# Patient Record
Sex: FEMALE | Race: WHITE | NOT HISPANIC OR LATINO
[De-identification: names, ages, dates, MRNs, and addresses within clinical notes are randomized per-mention and may not be internally consistent; named-entity substitution may affect disease eponyms.]

---

## 2017-11-06 ENCOUNTER — RESULT REVIEW (OUTPATIENT)
Age: 37
End: 2017-11-06

## 2018-11-13 ENCOUNTER — RESULT REVIEW (OUTPATIENT)
Age: 38
End: 2018-11-13

## 2019-12-05 ENCOUNTER — RESULT REVIEW (OUTPATIENT)
Age: 39
End: 2019-12-05

## 2020-03-16 ENCOUNTER — APPOINTMENT (OUTPATIENT)
Dept: OTOLARYNGOLOGY | Facility: CLINIC | Age: 40
End: 2020-03-16

## 2020-12-09 ENCOUNTER — RESULT REVIEW (OUTPATIENT)
Age: 40
End: 2020-12-09

## 2022-06-10 ENCOUNTER — APPOINTMENT (OUTPATIENT)
Dept: PULMONOLOGY | Facility: CLINIC | Age: 42
End: 2022-06-10
Payer: COMMERCIAL

## 2022-06-10 VITALS
WEIGHT: 162 LBS | TEMPERATURE: 98.3 F | DIASTOLIC BLOOD PRESSURE: 72 MMHG | HEART RATE: 77 BPM | OXYGEN SATURATION: 98 % | SYSTOLIC BLOOD PRESSURE: 126 MMHG | BODY MASS INDEX: 26.67 KG/M2 | HEIGHT: 65.5 IN

## 2022-06-10 DIAGNOSIS — Z83.2 FAMILY HISTORY OF DISEASES OF THE BLOOD AND BLOOD-FORMING ORGANS AND CERTAIN DISORDERS INVOLVING THE IMMUNE MECHANISM: ICD-10-CM

## 2022-06-10 DIAGNOSIS — Z82.49 FAMILY HISTORY OF ISCHEMIC HEART DISEASE AND OTHER DISEASES OF THE CIRCULATORY SYSTEM: ICD-10-CM

## 2022-06-10 DIAGNOSIS — E06.3 AUTOIMMUNE THYROIDITIS: ICD-10-CM

## 2022-06-10 PROCEDURE — 99204 OFFICE O/P NEW MOD 45 MIN: CPT | Mod: 25

## 2022-06-10 PROCEDURE — 95012 NITRIC OXIDE EXP GAS DETER: CPT

## 2022-06-10 PROCEDURE — 94010 BREATHING CAPACITY TEST: CPT

## 2022-06-10 NOTE — PROCEDURE
[FreeTextEntry1] : CT chest reveals minimal linear atelectasis in right middle lobe.  Inhomogeneous ventilation noted in that area as well consistent with asthma\par No findings of concern regarding neoplasm\par \par Spirometry demonstrates mild airway obstruction

## 2022-06-10 NOTE — HISTORY OF PRESENT ILLNESS
[TextBox_4] : Patient here for evaluation of abnormal chest CT\par Developed persistent cough.  Seen by PCP treated with inhaler with some relief.  Concerned and referred for chest CT which was noted to be abnormal.

## 2022-06-10 NOTE — ASSESSMENT
[FreeTextEntry1] : Findings on chest CT minimal and nonspecific.  In absence of continued symptoms would not recommend specific interval follow-up\par \par Has mild airway obstruction on spirometry.  Recommend continued as needed bronchodilator use and follow-up as needed

## 2023-06-16 ENCOUNTER — APPOINTMENT (OUTPATIENT)
Dept: PULMONOLOGY | Facility: CLINIC | Age: 43
End: 2023-06-16
Payer: COMMERCIAL

## 2023-06-16 VITALS
WEIGHT: 166.5 LBS | BODY MASS INDEX: 27.74 KG/M2 | HEIGHT: 65 IN | DIASTOLIC BLOOD PRESSURE: 79 MMHG | OXYGEN SATURATION: 100 % | HEART RATE: 74 BPM | SYSTOLIC BLOOD PRESSURE: 116 MMHG | TEMPERATURE: 98.2 F

## 2023-06-16 DIAGNOSIS — R93.89 ABNORMAL FINDINGS ON DIAGNOSTIC IMAGING OF OTHER SPECIFIED BODY STRUCTURES: ICD-10-CM

## 2023-06-16 PROCEDURE — 71046 X-RAY EXAM CHEST 2 VIEWS: CPT

## 2023-06-16 PROCEDURE — 94010 BREATHING CAPACITY TEST: CPT

## 2023-06-16 PROCEDURE — 99213 OFFICE O/P EST LOW 20 MIN: CPT | Mod: 25

## 2023-06-16 NOTE — HISTORY OF PRESENT ILLNESS
[TextBox_4] : Follow-up for persistent cough and abnormal chest CT.  Currently has no complaints has gained a little weight but otherwise has been in good health does not report shortness of breath or wheezing.

## 2023-06-16 NOTE — ASSESSMENT
[FreeTextEntry1] : Nonspecific findings on chest CT with interval normal chest x-ray no additional follow-up recommended for this may return for cough if symptomatic.

## 2024-02-21 ENCOUNTER — APPOINTMENT (OUTPATIENT)
Dept: PULMONOLOGY | Facility: CLINIC | Age: 44
End: 2024-02-21
Payer: COMMERCIAL

## 2024-02-21 VITALS
OXYGEN SATURATION: 98 % | HEART RATE: 50 BPM | SYSTOLIC BLOOD PRESSURE: 91 MMHG | WEIGHT: 198 LBS | DIASTOLIC BLOOD PRESSURE: 66 MMHG | HEIGHT: 62 IN | BODY MASS INDEX: 36.44 KG/M2

## 2024-02-21 VITALS
WEIGHT: 172 LBS | HEIGHT: 65 IN | SYSTOLIC BLOOD PRESSURE: 123 MMHG | DIASTOLIC BLOOD PRESSURE: 77 MMHG | HEART RATE: 81 BPM | OXYGEN SATURATION: 100 % | BODY MASS INDEX: 28.66 KG/M2

## 2024-02-21 DIAGNOSIS — R05.9 COUGH, UNSPECIFIED: ICD-10-CM

## 2024-02-21 PROCEDURE — 99213 OFFICE O/P EST LOW 20 MIN: CPT | Mod: 25

## 2024-02-21 PROCEDURE — ZZZZZ: CPT

## 2024-02-21 PROCEDURE — 71046 X-RAY EXAM CHEST 2 VIEWS: CPT

## 2024-02-21 PROCEDURE — 94060 EVALUATION OF WHEEZING: CPT

## 2025-07-01 ENCOUNTER — APPOINTMENT (OUTPATIENT)
Dept: ENDOCRINOLOGY | Facility: CLINIC | Age: 45
End: 2025-07-01

## 2025-07-29 ENCOUNTER — APPOINTMENT (OUTPATIENT)
Dept: PULMONOLOGY | Facility: CLINIC | Age: 45
End: 2025-07-29
Payer: COMMERCIAL

## 2025-07-29 VITALS
OXYGEN SATURATION: 96 % | BODY MASS INDEX: 28.32 KG/M2 | DIASTOLIC BLOOD PRESSURE: 84 MMHG | WEIGHT: 170 LBS | HEART RATE: 101 BPM | HEIGHT: 65 IN | RESPIRATION RATE: 16 BRPM | SYSTOLIC BLOOD PRESSURE: 119 MMHG

## 2025-07-29 DIAGNOSIS — R05.9 COUGH, UNSPECIFIED: ICD-10-CM

## 2025-07-29 PROCEDURE — 99213 OFFICE O/P EST LOW 20 MIN: CPT | Mod: 25

## 2025-07-29 PROCEDURE — 94060 EVALUATION OF WHEEZING: CPT

## 2025-07-29 PROCEDURE — 71046 X-RAY EXAM CHEST 2 VIEWS: CPT

## 2025-07-29 PROCEDURE — 95012 NITRIC OXIDE EXP GAS DETER: CPT

## 2025-07-29 RX ORDER — OMEPRAZOLE 40 MG/1
40 CAPSULE, DELAYED RELEASE ORAL
Qty: 30 | Refills: 3 | Status: ACTIVE | COMMUNITY
Start: 2025-07-29 | End: 1900-01-01

## 2025-07-31 ENCOUNTER — APPOINTMENT (OUTPATIENT)
Dept: ELECTROPHYSIOLOGY | Facility: CLINIC | Age: 45
End: 2025-07-31

## 2025-09-11 ENCOUNTER — APPOINTMENT (OUTPATIENT)
Dept: PULMONOLOGY | Facility: CLINIC | Age: 45
End: 2025-09-11